# Patient Record
Sex: FEMALE | Race: OTHER | HISPANIC OR LATINO
[De-identification: names, ages, dates, MRNs, and addresses within clinical notes are randomized per-mention and may not be internally consistent; named-entity substitution may affect disease eponyms.]

---

## 2021-07-15 ENCOUNTER — FORM ENCOUNTER (OUTPATIENT)
Age: 46
End: 2021-07-15

## 2021-09-30 PROBLEM — Z00.00 ENCOUNTER FOR PREVENTIVE HEALTH EXAMINATION: Status: ACTIVE | Noted: 2021-09-30

## 2021-10-04 ENCOUNTER — APPOINTMENT (OUTPATIENT)
Dept: BREAST CENTER | Facility: CLINIC | Age: 46
End: 2021-10-04
Payer: OTHER GOVERNMENT

## 2021-10-04 VITALS
OXYGEN SATURATION: 97 % | SYSTOLIC BLOOD PRESSURE: 131 MMHG | WEIGHT: 158 LBS | BODY MASS INDEX: 29.83 KG/M2 | HEIGHT: 61 IN | DIASTOLIC BLOOD PRESSURE: 86 MMHG | HEART RATE: 70 BPM

## 2021-10-04 PROCEDURE — 99072 ADDL SUPL MATRL&STAF TM PHE: CPT

## 2021-10-04 PROCEDURE — 99205 OFFICE O/P NEW HI 60 MIN: CPT

## 2021-10-04 RX ORDER — FEXOFENADINE HCL 60 MG
CAPSULE ORAL
Refills: 0 | Status: ACTIVE | COMMUNITY

## 2021-10-04 RX ORDER — BACILLUS COAGULANS/INULIN 1B-250 MG
CAPSULE ORAL
Refills: 0 | Status: ACTIVE | COMMUNITY

## 2021-10-14 ENCOUNTER — NON-APPOINTMENT (OUTPATIENT)
Age: 46
End: 2021-10-14

## 2021-10-14 ENCOUNTER — OUTPATIENT (OUTPATIENT)
Dept: OUTPATIENT SERVICES | Facility: HOSPITAL | Age: 46
LOS: 1 days | End: 2021-10-14
Payer: COMMERCIAL

## 2021-10-14 ENCOUNTER — RESULT REVIEW (OUTPATIENT)
Age: 46
End: 2021-10-14

## 2021-10-14 DIAGNOSIS — D05.12 INTRADUCTAL CARCINOMA IN SITU OF LEFT BREAST: ICD-10-CM

## 2021-10-14 PROCEDURE — 88321 CONSLTJ&REPRT SLD PREP ELSWR: CPT

## 2021-10-14 PROCEDURE — 88323 CONSLTJ&REPRT MATRL PREP SLD: CPT | Mod: 26

## 2021-10-14 PROCEDURE — 88323 CONSLTJ&REPRT MATRL PREP SLD: CPT

## 2021-10-15 ENCOUNTER — NON-APPOINTMENT (OUTPATIENT)
Age: 46
End: 2021-10-15

## 2021-10-15 LAB — SURGICAL PATHOLOGY STUDY: SIGNIFICANT CHANGE UP

## 2021-11-11 ENCOUNTER — NON-APPOINTMENT (OUTPATIENT)
Age: 46
End: 2021-11-11

## 2021-11-15 ENCOUNTER — APPOINTMENT (OUTPATIENT)
Dept: BREAST CENTER | Facility: CLINIC | Age: 46
End: 2021-11-15

## 2021-11-16 ENCOUNTER — LABORATORY RESULT (OUTPATIENT)
Age: 46
End: 2021-11-16

## 2021-11-18 ENCOUNTER — TRANSCRIPTION ENCOUNTER (OUTPATIENT)
Age: 46
End: 2021-11-18

## 2021-11-18 ENCOUNTER — OUTPATIENT (OUTPATIENT)
Dept: OUTPATIENT SERVICES | Facility: HOSPITAL | Age: 46
LOS: 1 days | End: 2021-11-18
Payer: OTHER GOVERNMENT

## 2021-11-18 VITALS
DIASTOLIC BLOOD PRESSURE: 83 MMHG | HEART RATE: 93 BPM | TEMPERATURE: 98 F | WEIGHT: 164.24 LBS | SYSTOLIC BLOOD PRESSURE: 135 MMHG | RESPIRATION RATE: 16 BRPM | HEIGHT: 61 IN | OXYGEN SATURATION: 100 %

## 2021-11-18 DIAGNOSIS — Z90.49 ACQUIRED ABSENCE OF OTHER SPECIFIED PARTS OF DIGESTIVE TRACT: Chronic | ICD-10-CM

## 2021-11-18 DIAGNOSIS — Z98.891 HISTORY OF UTERINE SCAR FROM PREVIOUS SURGERY: Chronic | ICD-10-CM

## 2021-11-18 PROCEDURE — 78195 LYMPH SYSTEM IMAGING: CPT

## 2021-11-18 PROCEDURE — 78195 LYMPH SYSTEM IMAGING: CPT | Mod: 26

## 2021-11-18 PROCEDURE — A9541: CPT

## 2021-11-18 NOTE — ASU PATIENT PROFILE, ADULT - NSICDXPASTMEDICALHX_GEN_ALL_CORE_FT
PAST MEDICAL HISTORY:  Breast mass     Gastritis     GERD (gastroesophageal reflux disease)     History of chronic fatigue     Nonalcoholic fatty liver disease without nonalcoholic steatohepatitis (BARRETO)

## 2021-11-19 ENCOUNTER — APPOINTMENT (OUTPATIENT)
Dept: BREAST CENTER | Facility: HOSPITAL | Age: 46
End: 2021-11-19

## 2021-11-19 ENCOUNTER — INPATIENT (INPATIENT)
Facility: HOSPITAL | Age: 46
LOS: 0 days | Discharge: HOME | DRG: 581 | End: 2021-11-20
Attending: SURGERY | Admitting: SURGERY
Payer: COMMERCIAL

## 2021-11-19 ENCOUNTER — RESULT REVIEW (OUTPATIENT)
Age: 46
End: 2021-11-19

## 2021-11-19 DIAGNOSIS — Z98.891 HISTORY OF UTERINE SCAR FROM PREVIOUS SURGERY: Chronic | ICD-10-CM

## 2021-11-19 DIAGNOSIS — Z90.49 ACQUIRED ABSENCE OF OTHER SPECIFIED PARTS OF DIGESTIVE TRACT: Chronic | ICD-10-CM

## 2021-11-19 PROCEDURE — 88307 TISSUE EXAM BY PATHOLOGIST: CPT | Mod: 26

## 2021-11-19 PROCEDURE — 19303 MAST SIMPLE COMPLETE: CPT | Mod: 50

## 2021-11-19 PROCEDURE — 38525 BIOPSY/REMOVAL LYMPH NODES: CPT | Mod: LT

## 2021-11-19 PROCEDURE — 88344 IMHCHEM/IMCYTCHM EA MLT ANTB: CPT | Mod: 26,59

## 2021-11-19 PROCEDURE — 88360 TUMOR IMMUNOHISTOCHEM/MANUAL: CPT | Mod: 26

## 2021-11-19 PROCEDURE — 88342 IMHCHEM/IMCYTCHM 1ST ANTB: CPT | Mod: 26,59

## 2021-11-19 PROCEDURE — 88331 PATH CONSLTJ SURG 1 BLK 1SPC: CPT | Mod: 26

## 2021-11-19 RX ORDER — CEFAZOLIN SODIUM 1 G
1000 VIAL (EA) INJECTION EVERY 8 HOURS
Refills: 0 | Status: DISCONTINUED | OUTPATIENT
Start: 2021-11-19 | End: 2021-11-20

## 2021-11-19 RX ORDER — OXYCODONE AND ACETAMINOPHEN 5; 325 MG/1; MG/1
1 TABLET ORAL EVERY 4 HOURS
Refills: 0 | Status: DISCONTINUED | OUTPATIENT
Start: 2021-11-19 | End: 2021-11-20

## 2021-11-19 RX ORDER — ONDANSETRON 8 MG/1
4 TABLET, FILM COATED ORAL EVERY 6 HOURS
Refills: 0 | Status: DISCONTINUED | OUTPATIENT
Start: 2021-11-19 | End: 2021-11-20

## 2021-11-19 RX ORDER — SENNA PLUS 8.6 MG/1
2 TABLET ORAL
Qty: 0 | Refills: 0 | DISCHARGE
Start: 2021-11-19

## 2021-11-19 RX ORDER — SENNA PLUS 8.6 MG/1
2 TABLET ORAL AT BEDTIME
Refills: 0 | Status: DISCONTINUED | OUTPATIENT
Start: 2021-11-19 | End: 2021-11-20

## 2021-11-19 RX ORDER — ACETAMINOPHEN 500 MG
650 TABLET ORAL EVERY 6 HOURS
Refills: 0 | Status: DISCONTINUED | OUTPATIENT
Start: 2021-11-19 | End: 2021-11-20

## 2021-11-19 RX ORDER — OXYCODONE AND ACETAMINOPHEN 5; 325 MG/1; MG/1
2 TABLET ORAL EVERY 6 HOURS
Refills: 0 | Status: DISCONTINUED | OUTPATIENT
Start: 2021-11-19 | End: 2021-11-20

## 2021-11-19 RX ORDER — HYDROMORPHONE HYDROCHLORIDE 2 MG/ML
0.5 INJECTION INTRAMUSCULAR; INTRAVENOUS; SUBCUTANEOUS
Refills: 0 | Status: DISCONTINUED | OUTPATIENT
Start: 2021-11-19 | End: 2021-11-19

## 2021-11-19 RX ORDER — APREPITANT 80 MG/1
40 CAPSULE ORAL ONCE
Refills: 0 | Status: COMPLETED | OUTPATIENT
Start: 2021-11-19 | End: 2021-11-19

## 2021-11-19 RX ORDER — ACETAMINOPHEN 500 MG
2 TABLET ORAL
Qty: 60 | Refills: 0
Start: 2021-11-19

## 2021-11-19 RX ORDER — ACETAMINOPHEN 500 MG
1000 TABLET ORAL ONCE
Refills: 0 | Status: COMPLETED | OUTPATIENT
Start: 2021-11-19 | End: 2021-11-19

## 2021-11-19 RX ADMIN — Medication 1000 MILLIGRAM(S): at 07:26

## 2021-11-19 RX ADMIN — APREPITANT 40 MILLIGRAM(S): 80 CAPSULE ORAL at 07:27

## 2021-11-19 RX ADMIN — Medication 650 MILLIGRAM(S): at 17:20

## 2021-11-19 RX ADMIN — Medication 650 MILLIGRAM(S): at 18:20

## 2021-11-19 RX ADMIN — Medication 100 MILLIGRAM(S): at 17:21

## 2021-11-19 RX ADMIN — Medication 100 MILLIGRAM(S): at 23:53

## 2021-11-19 NOTE — BRIEF OPERATIVE NOTE - NSICDXBRIEFPOSTOP_GEN_ALL_CORE_FT
POST-OP DIAGNOSIS:  Ductal carcinoma in situ (DCIS) of left breast 19-Nov-2021 12:03:25  Ada Gallo

## 2021-11-19 NOTE — PROGRESS NOTE ADULT - SUBJECTIVE AND OBJECTIVE BOX
Surgery Post-Op Note    Procedure: Bilateral breast mastectomy with left sentinel lymph node biopsy.     Diagnosis/Indication: left DCIS    Surgeon: Dr. Castro    S: Pt complaining of slight chest pain. Denies SOB, n/v, ab pain. Pain controlled with medication.    O:  T(C): 36.7 (11-19-21 @ 12:24), Max: 36.7 (11-19-21 @ 12:24)  T(F): 98.1 (11-19-21 @ 12:24), Max: 98.1 (11-19-21 @ 12:24)  HR: 80 (11-19-21 @ 13:24) (80 - 87)  BP: 121/80 (11-19-21 @ 13:24) (113/60 - 121/80)  RR: 18 (11-19-21 @ 13:24) (15 - 18)  SpO2: 99% (11-19-21 @ 13:24) (98% - 100%)  Wt(kg): --          Physical exam:   Gen: NAD, resting comfortably in bed  C/V: NSR  Pulm: Nonlabored breathing, no respiratory distress  Chest: dressing in place c/d/i on b/l chest, JPx2 SS more on sanguinous side with appropriate output, no evidence of swelling/hematoma appreciated   Abd: soft, NT/ND  Extrem: WWP, no calf edema, SCDs in place      A/P: 46yFemale s/p above procedure  Diet: Regualr  Pain/nausea control: Percocet and zofran  DVT ppx: SCD  Dispo plan: 23 hr obvs

## 2021-11-19 NOTE — ASU DISCHARGE PLAN (ADULT/PEDIATRIC) - CARE PROVIDER_API CALL
Carlota Isaacs)  Surgery  [address] [address]  Phone: [phone]  Fax: [phone]  Follow Up Time: 1 week    Eliseo Salazar  PLASTIC SURGERY  [address] [place] [id number]  Phone: [phone]  Fax: [phone]  Follow Up Time: 1 week

## 2021-11-19 NOTE — BRIEF OPERATIVE NOTE - OPERATION/FINDINGS
Patient supine. Prepped and draped sterile. Excision of right nipple and breast tissue. Hemostasis assured. Packed with wet lap. Excision of nipple and left breast tissue, left axillary dissection for encounter 3 hot sentinel lymph nodes. Irrigated. Hemostasis assured. Plastics placed bilateral tissue expanders prepectoral with Aloderm. Skin closed with Monocryl. Steri-strips. Non-adherent gauze and Tegaderm. Pink Bra

## 2021-11-19 NOTE — ASU DISCHARGE PLAN (ADULT/PEDIATRIC) - PROVIDER TOKENS
PROVIDER:[TOKEN:[[id number]],FOLLOWUP:[1 week]],PROVIDER:[TOKEN:[[id number]],FOLLOWUP:[1 week]]

## 2021-11-19 NOTE — ASU DISCHARGE PLAN (ADULT/PEDIATRIC) - ASU DC SPECIAL INSTRUCTIONSFT
Please, leave the pink bra on all the time until Dr. Salazar's team reaches out to you   Please, keep dressings dry. No showering in the next 48 hours or until Dr. Salazar's team tells you   Please, use the incentive spirometer as instructed at least 5 times every hour   Please, measure and record the drain output. Dr. Salazar will reach out to you to set appointment for removal in the first week  Please, stay out of bed and walk at least 5 times a day

## 2021-11-20 ENCOUNTER — TRANSCRIPTION ENCOUNTER (OUTPATIENT)
Age: 46
End: 2021-11-20

## 2021-11-20 VITALS
HEART RATE: 82 BPM | RESPIRATION RATE: 19 BRPM | SYSTOLIC BLOOD PRESSURE: 119 MMHG | OXYGEN SATURATION: 98 % | TEMPERATURE: 98 F | DIASTOLIC BLOOD PRESSURE: 73 MMHG

## 2021-11-20 LAB
ANION GAP SERPL CALC-SCNC: 17 MMOL/L — SIGNIFICANT CHANGE UP (ref 5–17)
BUN SERPL-MCNC: 8 MG/DL — SIGNIFICANT CHANGE UP (ref 7–23)
CALCIUM SERPL-MCNC: 9.2 MG/DL — SIGNIFICANT CHANGE UP (ref 8.4–10.5)
CHLORIDE SERPL-SCNC: 106 MMOL/L — SIGNIFICANT CHANGE UP (ref 96–108)
CO2 SERPL-SCNC: 17 MMOL/L — LOW (ref 22–31)
COVID-19 NUCLEOCAPSID GAM AB INTERP: NEGATIVE — SIGNIFICANT CHANGE UP
COVID-19 NUCLEOCAPSID TOTAL GAM ANTIBODY RESULT: 0.08 INDEX — SIGNIFICANT CHANGE UP
COVID-19 SPIKE DOMAIN AB INTERP: POSITIVE
COVID-19 SPIKE DOMAIN ANTIBODY RESULT: >250 U/ML — HIGH
CREAT SERPL-MCNC: 0.82 MG/DL — SIGNIFICANT CHANGE UP (ref 0.5–1.3)
GLUCOSE SERPL-MCNC: 162 MG/DL — HIGH (ref 70–99)
HCT VFR BLD CALC: 37.5 % — SIGNIFICANT CHANGE UP (ref 34.5–45)
HGB BLD-MCNC: 12.4 G/DL — SIGNIFICANT CHANGE UP (ref 11.5–15.5)
MAGNESIUM SERPL-MCNC: 2.2 MG/DL — SIGNIFICANT CHANGE UP (ref 1.6–2.6)
MCHC RBC-ENTMCNC: 29 PG — SIGNIFICANT CHANGE UP (ref 27–34)
MCHC RBC-ENTMCNC: 33.1 GM/DL — SIGNIFICANT CHANGE UP (ref 32–36)
MCV RBC AUTO: 87.6 FL — SIGNIFICANT CHANGE UP (ref 80–100)
NRBC # BLD: 0 /100 WBCS — SIGNIFICANT CHANGE UP (ref 0–0)
PHOSPHATE SERPL-MCNC: 2.6 MG/DL — SIGNIFICANT CHANGE UP (ref 2.5–4.5)
PLATELET # BLD AUTO: 254 K/UL — SIGNIFICANT CHANGE UP (ref 150–400)
POTASSIUM SERPL-MCNC: 3.8 MMOL/L — SIGNIFICANT CHANGE UP (ref 3.5–5.3)
POTASSIUM SERPL-SCNC: 3.8 MMOL/L — SIGNIFICANT CHANGE UP (ref 3.5–5.3)
RBC # BLD: 4.28 M/UL — SIGNIFICANT CHANGE UP (ref 3.8–5.2)
RBC # FLD: 14.2 % — SIGNIFICANT CHANGE UP (ref 10.3–14.5)
SARS-COV-2 IGG+IGM SERPL QL IA: 0.08 INDEX — SIGNIFICANT CHANGE UP
SARS-COV-2 IGG+IGM SERPL QL IA: >250 U/ML — HIGH
SARS-COV-2 IGG+IGM SERPL QL IA: NEGATIVE — SIGNIFICANT CHANGE UP
SARS-COV-2 IGG+IGM SERPL QL IA: POSITIVE
SODIUM SERPL-SCNC: 140 MMOL/L — SIGNIFICANT CHANGE UP (ref 135–145)
WBC # BLD: 13.76 K/UL — HIGH (ref 3.8–10.5)
WBC # FLD AUTO: 13.76 K/UL — HIGH (ref 3.8–10.5)

## 2021-11-20 PROCEDURE — 88360 TUMOR IMMUNOHISTOCHEM/MANUAL: CPT

## 2021-11-20 PROCEDURE — 80048 BASIC METABOLIC PNL TOTAL CA: CPT

## 2021-11-20 PROCEDURE — 83735 ASSAY OF MAGNESIUM: CPT

## 2021-11-20 PROCEDURE — 36415 COLL VENOUS BLD VENIPUNCTURE: CPT

## 2021-11-20 PROCEDURE — 84100 ASSAY OF PHOSPHORUS: CPT

## 2021-11-20 PROCEDURE — 88344 IMHCHEM/IMCYTCHM EA MLT ANTB: CPT

## 2021-11-20 PROCEDURE — 88307 TISSUE EXAM BY PATHOLOGIST: CPT

## 2021-11-20 PROCEDURE — 88331 PATH CONSLTJ SURG 1 BLK 1SPC: CPT

## 2021-11-20 PROCEDURE — 86769 SARS-COV-2 COVID-19 ANTIBODY: CPT

## 2021-11-20 PROCEDURE — 85027 COMPLETE CBC AUTOMATED: CPT

## 2021-11-20 PROCEDURE — C1789: CPT

## 2021-11-20 PROCEDURE — 88341 IMHCHEM/IMCYTCHM EA ADD ANTB: CPT

## 2021-11-20 RX ORDER — SODIUM,POTASSIUM PHOSPHATES 278-250MG
1 POWDER IN PACKET (EA) ORAL ONCE
Refills: 0 | Status: COMPLETED | OUTPATIENT
Start: 2021-11-20 | End: 2021-11-20

## 2021-11-20 RX ADMIN — Medication 650 MILLIGRAM(S): at 09:45

## 2021-11-20 RX ADMIN — Medication 100 MILLIGRAM(S): at 07:01

## 2021-11-20 RX ADMIN — Medication 1 PACKET(S): at 11:08

## 2021-11-20 RX ADMIN — Medication 650 MILLIGRAM(S): at 10:45

## 2021-11-20 NOTE — PROGRESS NOTE ADULT - SUBJECTIVE AND OBJECTIVE BOX
SUBJECTIVE:      MEDICATIONS  (STANDING):  ceFAZolin   IVPB 1000 milliGRAM(s) IV Intermittent every 8 hours    MEDICATIONS  (PRN):  acetaminophen     Tablet .. 650 milliGRAM(s) Oral every 6 hours PRN Mild Pain (1 - 3)  ondansetron Injectable 4 milliGRAM(s) IV Push every 6 hours PRN Nausea  oxycodone    5 mG/acetaminophen 325 mG 1 Tablet(s) Oral every 4 hours PRN Moderate Pain (4 - 6)  oxycodone    5 mG/acetaminophen 325 mG 2 Tablet(s) Oral every 6 hours PRN Severe Pain (7 - 10)  senna 2 Tablet(s) Oral at bedtime PRN Constipation      Vital Signs Last 24 Hrs  T(C): 36.8 (20 Nov 2021 05:43), Max: 36.9 (19 Nov 2021 15:00)  T(F): 98.2 (20 Nov 2021 05:43), Max: 98.4 (19 Nov 2021 15:00)  HR: 71 (20 Nov 2021 05:43) (71 - 87)  BP: 106/65 (20 Nov 2021 05:43) (106/65 - 141/86)  BP(mean): 95 (19 Nov 2021 13:39) (81 - 96)  RR: 17 (20 Nov 2021 05:43) (15 - 18)  SpO2: 97% (20 Nov 2021 05:43) (97% - 100%)    Physical Exam:      I&O's Summary    19 Nov 2021 07:01  -  20 Nov 2021 07:00  --------------------------------------------------------  IN: 240 mL / OUT: 2145 mL / NET: -1905 mL        LABS:              CAPILLARY BLOOD GLUCOSE            RADIOLOGY & ADDITIONAL STUDIES:   SUBJECTIVE: Doing well this AM. NAEON. Denies n/v. Endorses f/bm. Denies cp/sob. Pain is well controlled. Ambulating as tolerated. Tolerating diet.      MEDICATIONS  (STANDING):  ceFAZolin   IVPB 1000 milliGRAM(s) IV Intermittent every 8 hours    MEDICATIONS  (PRN):  acetaminophen     Tablet .. 650 milliGRAM(s) Oral every 6 hours PRN Mild Pain (1 - 3)  ondansetron Injectable 4 milliGRAM(s) IV Push every 6 hours PRN Nausea  oxycodone    5 mG/acetaminophen 325 mG 1 Tablet(s) Oral every 4 hours PRN Moderate Pain (4 - 6)  oxycodone    5 mG/acetaminophen 325 mG 2 Tablet(s) Oral every 6 hours PRN Severe Pain (7 - 10)  senna 2 Tablet(s) Oral at bedtime PRN Constipation      Vital Signs Last 24 Hrs  T(C): 36.8 (20 Nov 2021 05:43), Max: 36.9 (19 Nov 2021 15:00)  T(F): 98.2 (20 Nov 2021 05:43), Max: 98.4 (19 Nov 2021 15:00)  HR: 71 (20 Nov 2021 05:43) (71 - 87)  BP: 106/65 (20 Nov 2021 05:43) (106/65 - 141/86)  BP(mean): 95 (19 Nov 2021 13:39) (81 - 96)  RR: 17 (20 Nov 2021 05:43) (15 - 18)  SpO2: 97% (20 Nov 2021 05:43) (97% - 100%)    Physical Exam:  GEN: NAD, resting comfortably in bed  HEENT: MMM  CV: RRR  Resp: nonlabored breathing, no respiratory distress  Breast: incisions clean/dry/intact, JPs w/ ss output  Abd: soft, nontender, nondistended  Ext: WWP, no edema, no calf tenderness  Neuro: no focal deficits  Psych: pleasant    I&O's Summary    19 Nov 2021 07:01  -  20 Nov 2021 07:00  --------------------------------------------------------  IN: 240 mL / OUT: 2145 mL / NET: -1905 mL

## 2021-11-20 NOTE — DISCHARGE NOTE NURSING/CASE MANAGEMENT/SOCIAL WORK - PATIENT PORTAL LINK FT
You can access the FollowMyHealth Patient Portal offered by [facility] by registering at the following website: [email or web address]. By joining [employer]’s FollowMyHealth portal, you will also be able to view your health information using other applications (apps) compatible with our system.

## 2021-11-20 NOTE — PROGRESS NOTE ADULT - SUBJECTIVE AND OBJECTIVE BOX
S: pt seen and examined doing great. ambulating, tolerating diet, pain controlled    AVSS    Gen NAD  Breasts: soft no collections, drains ss; dressings cdi    A/P 46 F POD1 from starla mastectomy and recon with TE    -ok for dc home from prs perspective  -use prescriptions as directed  -measure drain outputs  -encourage ambulation and IS  -f/u with Dr. Salazar

## 2021-11-20 NOTE — PROGRESS NOTE ADULT - ASSESSMENT
46F s/p bilat mastectomy w/ LNB + tissue expanders.     Regular diet  OOB/IS/AMB  Pain   Nausea   ARIANA drain x2   Pink bra on at all times  d/c Home today

## 2021-11-24 DIAGNOSIS — D05.12 INTRADUCTAL CARCINOMA IN SITU OF LEFT BREAST: ICD-10-CM

## 2021-11-24 DIAGNOSIS — K21.9 GASTRO-ESOPHAGEAL REFLUX DISEASE WITHOUT ESOPHAGITIS: ICD-10-CM

## 2021-11-24 DIAGNOSIS — R11.0 NAUSEA: ICD-10-CM

## 2021-12-01 LAB — SURGICAL PATHOLOGY STUDY: SIGNIFICANT CHANGE UP

## 2021-12-02 ENCOUNTER — APPOINTMENT (OUTPATIENT)
Dept: BREAST CENTER | Facility: CLINIC | Age: 46
End: 2021-12-02
Payer: OTHER GOVERNMENT

## 2021-12-02 ENCOUNTER — APPOINTMENT (OUTPATIENT)
Dept: HEMATOLOGY ONCOLOGY | Facility: CLINIC | Age: 46
End: 2021-12-02
Payer: OTHER GOVERNMENT

## 2021-12-02 VITALS
DIASTOLIC BLOOD PRESSURE: 78 MMHG | HEART RATE: 86 BPM | HEIGHT: 61 IN | SYSTOLIC BLOOD PRESSURE: 128 MMHG | BODY MASS INDEX: 30.58 KG/M2 | WEIGHT: 162 LBS

## 2021-12-02 PROBLEM — K21.9 GASTRO-ESOPHAGEAL REFLUX DISEASE WITHOUT ESOPHAGITIS: Chronic | Status: ACTIVE | Noted: 2021-11-18

## 2021-12-02 PROBLEM — N63.0 UNSPECIFIED LUMP IN UNSPECIFIED BREAST: Chronic | Status: ACTIVE | Noted: 2021-11-18

## 2021-12-02 PROBLEM — K76.0 FATTY (CHANGE OF) LIVER, NOT ELSEWHERE CLASSIFIED: Chronic | Status: ACTIVE | Noted: 2021-11-18

## 2021-12-02 PROBLEM — Z87.898 PERSONAL HISTORY OF OTHER SPECIFIED CONDITIONS: Chronic | Status: ACTIVE | Noted: 2021-11-18

## 2021-12-02 PROBLEM — K29.70 GASTRITIS, UNSPECIFIED, WITHOUT BLEEDING: Chronic | Status: ACTIVE | Noted: 2021-11-18

## 2021-12-02 PROCEDURE — 99072 ADDL SUPL MATRL&STAF TM PHE: CPT

## 2021-12-02 PROCEDURE — 99204 OFFICE O/P NEW MOD 45 MIN: CPT

## 2021-12-02 PROCEDURE — 99244 OFF/OP CNSLTJ NEW/EST MOD 40: CPT

## 2021-12-02 PROCEDURE — 99024 POSTOP FOLLOW-UP VISIT: CPT

## 2021-12-02 NOTE — REVIEW OF SYSTEMS
[Fever] : no fever [Chills] : no chills [Chest Pain] : no chest pain [Palpitations] : no palpitations [Shortness Of Breath] : no shortness of breath [Cough] : no cough [Abdominal Pain] : no abdominal pain [Vomiting] : no vomiting [Pelvic Pain] : no pelvic pain [Abn Vaginal Bleeding] : no unexplained vaginal bleeding [Joint Swelling] : no joint swelling [Joint Stiffness] : no joint stiffness [Skin Lesions] : no skin lesions [Skin Wound] : no skin wound [Dizziness] : no dizziness [Fainting] : no fainting [Hot Flashes] : no hot flashes [Muscle Weakness] : no muscle weakness [Swollen Glands] : no swollen glands [Swollen Glands In The Neck] : no swollen glands in the neck

## 2021-12-02 NOTE — HISTORY OF PRESENT ILLNESS
[de-identified] : Ms. DALY is a 46 year old female who is being seen at the request of Dr. Gloria Kearney to discuss adjuvant systemic treatment options for a recently diagnosed LEFT breast cancer. The patient was experiencing BL nipple sensitivity but she did not have palpable masses, skin changes, or nipple discharge bilaterally. Of note, her sister is BRCA 1 positive and underwent a prophylactic BL mastectomy and her mother was BRCA 1 positive and had ovarian CA.  \par \par 7/16/21: B/L MG ([facility])- Fibroglandular. L UOQ 3:00 microcalcs. BIRADS 0\par \par 8/31/21: Dx L MG ([facility])- Fibroglandular. L 2.4cm 2:00 pleomorphic microcalcs (stereo bx rec.). US- WNL. BIRADS 4\par \par 9/16/21: L stereo bx for 2:00 calcs ([facility])- DCIS, high nuclear grade. ER/[place] -\par \par 10/21: Bilateral mastectomy. Path: 5.4 cm DCIS, positive anterior margin LEFT breast; 0.1 cm area of invasive carcinoma, HER2+

## 2021-12-02 NOTE — PHYSICAL EXAM
[de-identified] : s/p bilateral mastectomy, surgical scars healing well.  [de-identified] : Bilateral breast/axilla/supraclavicular area: No masses, discharge, or adenopathy [de-identified] : 4:00 post bx site healed

## 2021-12-06 ENCOUNTER — APPOINTMENT (OUTPATIENT)
Dept: HEMATOLOGY ONCOLOGY | Facility: CLINIC | Age: 46
End: 2021-12-06
Payer: OTHER GOVERNMENT

## 2021-12-06 VITALS
SYSTOLIC BLOOD PRESSURE: 126 MMHG | RESPIRATION RATE: 18 BRPM | DIASTOLIC BLOOD PRESSURE: 81 MMHG | TEMPERATURE: 98.3 F | HEART RATE: 73 BPM | OXYGEN SATURATION: 99 % | WEIGHT: 163 LBS | BODY MASS INDEX: 30.78 KG/M2 | HEIGHT: 61 IN

## 2021-12-06 PROCEDURE — 99214 OFFICE O/P EST MOD 30 MIN: CPT

## 2021-12-06 PROCEDURE — 99072 ADDL SUPL MATRL&STAF TM PHE: CPT

## 2021-12-06 NOTE — PAST MEDICAL HISTORY
[Menstruating] : The patient is menstruating [Menarche Age ____] : age at menarche was [unfilled] [Definite ___ (Date)] : the last menstrual period was [unfilled] [Regular Cycle Intervals] : have been regular [Total Preg ___] : G[unfilled] [Live Births ___] : P[unfilled]  [Age At Live Birth ___] : Age at live birth: [unfilled] [History of Hormone Replacement Treatment] : has no history of hormone replacement treatment [FreeTextEntry1] : hx of polyps [FreeTextEntry5] : yes [FreeTextEntry6] : no [FreeTextEntry7] : yes [FreeTextEntry8] : yes

## 2021-12-06 NOTE — HISTORY OF PRESENT ILLNESS
[Disease: _____________________] : Disease: [unfilled] [T: ___] : T[unfilled] [N: ___] : N[unfilled] [M: ___] : M[unfilled] [AJCC Stage: ____] : AJCC Stage: [unfilled] [Treatment Protocol] : Treatment Protocol [de-identified] : Ms. DALY is a 46 year old female who is being seen at the request of Dr. Gloria Kearney to discuss adjuvant systemic treatment options for a recently diagnosed LEFT breast cancer. The patient was experiencing BL nipple sensitivity but she did not have palpable masses, skin changes, or nipple discharge bilaterally. Of note, her sister is BRCA 1 positive and underwent a prophylactic BL mastectomy and her mother was BRCA 1 positive and had ovarian CA.  \par \par 7/16/21: B/L MG ([facility])- Fibroglandular. L UOQ 3:00 microcalcs. BIRADS 0\par \par 8/31/21: Dx L MG ([facility])- Fibroglandular. L 2.4cm 2:00 pleomorphic microcalcs (stereo bx rec.). US- WNL. BIRADS 4\par \par 9/16/21: L stereo bx for 2:00 calcs ([facility])- DCIS, high nuclear grade. ER/[place] -\par \par 10/21: Bilateral mastectomy and L SLNB Path: 5.4 cm DCIS, positive anterior margin LEFT breast; 0.1 cm area of invasive ductal carcinoma, 0/3 LNs. HER2+, ER-, [place]-  [de-identified] : invasive ductal carcinoma [de-identified] : HER+, ER-, [place]- [de-identified] : Genetic testing negative [FreeTextEntry1] : Adjuvant weekly paclitaxel x 12 in combination with trastuzumab (8 mg/kg loading dose followed by 6 mg/kg IV every 3 weeks). Trastuzumab will be continued after completion of paclitaxel therapy to complete a total of 1 year [de-identified] : Patient is planning to fly to [place] on 12/15/21.

## 2021-12-06 NOTE — PHYSICAL EXAM
[Normal] : affect appropriate [de-identified] : s/p bilateral mastectomy, surgical scars healing well.  [de-identified] : Bilateral breast/axilla/supraclavicular area: No masses, discharge, or adenopathy [de-identified] : 4:00 post bx site healed

## 2021-12-06 NOTE — REVIEW OF SYSTEMS
[Negative] : Constitutional [Fever] : no fever [Chills] : no chills [Chest Pain] : no chest pain [Palpitations] : no palpitations [Shortness Of Breath] : no shortness of breath [Cough] : no cough [Abdominal Pain] : no abdominal pain [Vomiting] : no vomiting [Pelvic Pain] : no pelvic pain [Abn Vaginal Bleeding] : no unexplained vaginal bleeding [Joint Swelling] : no joint swelling [Joint Stiffness] : no joint stiffness [Skin Lesions] : no skin lesions [Skin Wound] : no skin wound [Dizziness] : no dizziness [Fainting] : no fainting [Hot Flashes] : no hot flashes [Muscle Weakness] : no muscle weakness [Swollen Glands] : no swollen glands [Swollen Glands In The Neck] : no swollen glands in the neck

## 2021-12-07 LAB
ALBUMIN SERPL ELPH-MCNC: 4.5 G/DL
ALP BLD-CCNC: 59 U/L
ALT SERPL-CCNC: 38 U/L
ANION GAP SERPL CALC-SCNC: 16 MMOL/L
APTT BLD: 23.6 SEC
AST SERPL-CCNC: 30 U/L
BASOPHILS # BLD AUTO: 0.02 K/UL
BASOPHILS NFR BLD AUTO: 0.3 %
BILIRUB SERPL-MCNC: 0.2 MG/DL
BUN SERPL-MCNC: 9 MG/DL
CALCIUM SERPL-MCNC: 9.6 MG/DL
CHLORIDE SERPL-SCNC: 103 MMOL/L
CO2 SERPL-SCNC: 20 MMOL/L
CREAT SERPL-MCNC: 0.83 MG/DL
EOSINOPHIL # BLD AUTO: 0.14 K/UL
EOSINOPHIL NFR BLD AUTO: 2.3 %
GLUCOSE SERPL-MCNC: 66 MG/DL
HCT VFR BLD CALC: 38.4 %
HGB BLD-MCNC: 12.6 G/DL
IMM GRANULOCYTES NFR BLD AUTO: 0.3 %
INR PPP: 0.97 RATIO
LYMPHOCYTES # BLD AUTO: 2.13 K/UL
LYMPHOCYTES NFR BLD AUTO: 34.6 %
MAN DIFF?: NORMAL
MCHC RBC-ENTMCNC: 28.4 PG
MCHC RBC-ENTMCNC: 32.8 GM/DL
MCV RBC AUTO: 86.5 FL
MONOCYTES # BLD AUTO: 0.3 K/UL
MONOCYTES NFR BLD AUTO: 4.9 %
NEUTROPHILS # BLD AUTO: 3.55 K/UL
NEUTROPHILS NFR BLD AUTO: 57.6 %
PLATELET # BLD AUTO: 321 K/UL
POTASSIUM SERPL-SCNC: 4.1 MMOL/L
PROT SERPL-MCNC: 7.1 G/DL
PT BLD: 11.6 SEC
RBC # BLD: 4.44 M/UL
RBC # FLD: 13.6 %
SODIUM SERPL-SCNC: 139 MMOL/L
WBC # FLD AUTO: 6.16 K/UL

## 2021-12-08 ENCOUNTER — FORM ENCOUNTER (OUTPATIENT)
Age: 46
End: 2021-12-08

## 2021-12-09 ENCOUNTER — OUTPATIENT (OUTPATIENT)
Dept: OUTPATIENT SERVICES | Facility: HOSPITAL | Age: 46
LOS: 1 days | End: 2021-12-09
Payer: COMMERCIAL

## 2021-12-09 DIAGNOSIS — C50.912 MALIGNANT NEOPLASM OF UNSPECIFIED SITE OF LEFT FEMALE BREAST: ICD-10-CM

## 2021-12-09 DIAGNOSIS — Z98.891 HISTORY OF UTERINE SCAR FROM PREVIOUS SURGERY: Chronic | ICD-10-CM

## 2021-12-09 DIAGNOSIS — Z90.49 ACQUIRED ABSENCE OF OTHER SPECIFIED PARTS OF DIGESTIVE TRACT: Chronic | ICD-10-CM

## 2021-12-09 PROCEDURE — 93306 TTE W/DOPPLER COMPLETE: CPT | Mod: 26

## 2021-12-09 PROCEDURE — 93306 TTE W/DOPPLER COMPLETE: CPT

## 2021-12-13 ENCOUNTER — APPOINTMENT (OUTPATIENT)
Dept: INTERVENTIONAL RADIOLOGY/VASCULAR | Facility: HOSPITAL | Age: 46
End: 2021-12-13
Payer: OTHER GOVERNMENT

## 2021-12-13 ENCOUNTER — OUTPATIENT (OUTPATIENT)
Dept: OUTPATIENT SERVICES | Facility: HOSPITAL | Age: 46
LOS: 1 days | End: 2021-12-13
Payer: COMMERCIAL

## 2021-12-13 ENCOUNTER — RESULT REVIEW (OUTPATIENT)
Age: 46
End: 2021-12-13

## 2021-12-13 DIAGNOSIS — Z98.891 HISTORY OF UTERINE SCAR FROM PREVIOUS SURGERY: Chronic | ICD-10-CM

## 2021-12-13 DIAGNOSIS — Z90.49 ACQUIRED ABSENCE OF OTHER SPECIFIED PARTS OF DIGESTIVE TRACT: Chronic | ICD-10-CM

## 2021-12-13 PROCEDURE — 99152 MOD SED SAME PHYS/QHP 5/>YRS: CPT

## 2021-12-13 PROCEDURE — 77001 FLUOROGUIDE FOR VEIN DEVICE: CPT | Mod: 26

## 2021-12-13 PROCEDURE — 36561 INSERT TUNNELED CV CATH: CPT

## 2021-12-13 PROCEDURE — 76937 US GUIDE VASCULAR ACCESS: CPT | Mod: 26

## 2021-12-13 PROCEDURE — 76937 US GUIDE VASCULAR ACCESS: CPT

## 2021-12-13 PROCEDURE — C1788: CPT

## 2021-12-13 PROCEDURE — 77001 FLUOROGUIDE FOR VEIN DEVICE: CPT

## 2021-12-13 PROCEDURE — C1769: CPT

## 2021-12-13 PROCEDURE — 99153 MOD SED SAME PHYS/QHP EA: CPT

## 2021-12-14 LAB
HBV CORE IGG+IGM SER QL: NONREACTIVE
HBV SURFACE AB SER QL: REACTIVE
HBV SURFACE AG SER QL: NONREACTIVE
SARS-COV-2 N GENE NPH QL NAA+PROBE: NOT DETECTED

## 2021-12-22 ENCOUNTER — APPOINTMENT (OUTPATIENT)
Dept: INTERVENTIONAL RADIOLOGY/VASCULAR | Facility: HOSPITAL | Age: 46
End: 2021-12-22

## 2022-07-20 PROBLEM — Z01.812 ENCOUNTER FOR PREPROCEDURE SCREENING LABORATORY TESTING FOR COVID-19: Status: ACTIVE | Noted: 2021-12-03

## 2022-07-25 ENCOUNTER — APPOINTMENT (OUTPATIENT)
Dept: BREAST CENTER | Facility: CLINIC | Age: 47
End: 2022-07-25

## 2022-07-25 DIAGNOSIS — Z20.822 ENCOUNTER FOR PREPROCEDURAL LABORATORY EXAMINATION: ICD-10-CM

## 2022-07-25 DIAGNOSIS — Z01.812 ENCOUNTER FOR PREPROCEDURAL LABORATORY EXAMINATION: ICD-10-CM

## 2022-09-06 PROBLEM — D05.12 DUCTAL CARCINOMA IN SITU (DCIS) OF LEFT BREAST: Status: ACTIVE | Noted: 2021-09-30

## 2022-09-06 PROBLEM — C50.912 HER2-POSITIVE CARCINOMA OF LEFT BREAST: Status: ACTIVE | Noted: 2021-12-02

## 2022-09-12 ENCOUNTER — APPOINTMENT (OUTPATIENT)
Dept: BREAST CENTER | Facility: CLINIC | Age: 47
End: 2022-09-12

## 2022-09-12 VITALS
HEIGHT: 61 IN | BODY MASS INDEX: 29.07 KG/M2 | WEIGHT: 154 LBS | DIASTOLIC BLOOD PRESSURE: 91 MMHG | SYSTOLIC BLOOD PRESSURE: 134 MMHG | HEART RATE: 88 BPM

## 2022-09-12 DIAGNOSIS — D05.12 INTRADUCTAL CARCINOMA IN SITU OF LEFT BREAST: ICD-10-CM

## 2022-09-12 DIAGNOSIS — C50.912 MALIGNANT NEOPLASM OF UNSPECIFIED SITE OF LEFT FEMALE BREAST: ICD-10-CM

## 2022-09-12 PROCEDURE — 99072 ADDL SUPL MATRL&STAF TM PHE: CPT

## 2022-09-12 PROCEDURE — 99214 OFFICE O/P EST MOD 30 MIN: CPT

## 2022-09-12 RX ORDER — TRASTUZUMAB 150 MG/7.4ML
INJECTION, POWDER, LYOPHILIZED, FOR SOLUTION INTRAVENOUS
Refills: 0 | Status: ACTIVE | COMMUNITY

## 2022-09-12 RX ORDER — TRASTUZUMAB-ANNS 150 MG/7.15ML
INJECTION, POWDER, LYOPHILIZED, FOR SOLUTION INTRAVENOUS
Refills: 0 | Status: ACTIVE | COMMUNITY

## 2022-09-22 ENCOUNTER — TRANSCRIPTION ENCOUNTER (OUTPATIENT)
Age: 47
End: 2022-09-22

## 2023-03-16 ENCOUNTER — APPOINTMENT (OUTPATIENT)
Dept: BREAST CENTER | Facility: CLINIC | Age: 48
End: 2023-03-16
Payer: OTHER GOVERNMENT

## 2023-03-16 VITALS
WEIGHT: 162 LBS | SYSTOLIC BLOOD PRESSURE: 134 MMHG | BODY MASS INDEX: 30.58 KG/M2 | DIASTOLIC BLOOD PRESSURE: 88 MMHG | HEIGHT: 61 IN | HEART RATE: 87 BPM

## 2023-03-16 DIAGNOSIS — Z90.13 ACQUIRED ABSENCE OF BILATERAL BREASTS AND NIPPLES: ICD-10-CM

## 2023-03-16 DIAGNOSIS — Z80.41 FAMILY HISTORY OF MALIGNANT NEOPLASM OF OVARY: ICD-10-CM

## 2023-03-16 DIAGNOSIS — Z80.0 FAMILY HISTORY OF MALIGNANT NEOPLASM OF DIGESTIVE ORGANS: ICD-10-CM

## 2023-03-16 DIAGNOSIS — Z85.3 PERSONAL HISTORY OF MALIGNANT NEOPLASM OF BREAST: ICD-10-CM

## 2023-03-16 DIAGNOSIS — Z78.9 OTHER SPECIFIED HEALTH STATUS: ICD-10-CM

## 2023-03-16 PROCEDURE — 99213 OFFICE O/P EST LOW 20 MIN: CPT

## 2023-03-16 PROCEDURE — 99072 ADDL SUPL MATRL&STAF TM PHE: CPT

## 2023-03-16 NOTE — PAST MEDICAL HISTORY
[Menarche Age ____] : age at menarche was [unfilled] [Definite ___ (Date)] : the last menstrual period was [unfilled] [Regular Cycle Intervals] : have been regular [Total Preg ___] : G[unfilled] [Live Births ___] : P[unfilled]  [Age At Live Birth ___] : Age at live birth: [unfilled] [History of Hormone Replacement Treatment] : has no history of hormone replacement treatment [FreeTextEntry1] : hx of polyps [FreeTextEntry5] : yes [FreeTextEntry6] : no [FreeTextEntry7] : yes [FreeTextEntry8] : yes

## 2023-03-16 NOTE — PHYSICAL EXAM
[Normocephalic] : normocephalic [EOMI] : extra ocular movement intact [Supple] : supple [No Supraclavicular Adenopathy] : no supraclavicular adenopathy [No Cervical Adenopathy] : no cervical adenopathy [de-identified] : B/l chest wall/axilla/supraclavicular area: No evidence of recurrence\par  [de-identified] : \par

## 2023-03-16 NOTE — HISTORY OF PRESENT ILLNESS
[FreeTextEntry1] : Patient is a 48y.o F presents for breast cancer surveillance. S/p B/L TM and L SNB on 11/19/21 (age 46) for LEFT 5.4cm DCIS, ER/[place]-.  Surgical pathology yielded LEFT 0.1cm IDC (ER/MA-, HER2+), DCIS, 0/3 LN. Reconstruction with Dr. Salazar. S/p adjuvant chemotherapy (Dr. Tyler Padron- [place]) and RT. Currently on Herceptin. FHx of breast cancer in maternal grandmother and maternal aunt, ovarian cancer in mother (BRCA+), and sister is BRCA+ (no cancer). Patient is BRCA/9 gene panel negative (tested 2021). Patient denies palpable masses or skin changes bilaterally.\par \par TNM stage: T1a, N0, M0 \par AJCC Stage: 1A \par \par 7/16/21: B/L MG ([facility])- Fibroglandular. L UOQ 3:00 microcalcs. BIRADS 0\par 8/31/21: Dx L MG ([facility])- Fibroglandular. L 2.4cm 2:00 pleomorphic microcalcs (stereo bx rec.). US- WNL. BIRADS 4\par 9/16/21: L stereo bx for 2:00 calcs ([facility])- DCIS, high nuclear grade. ER/[place] -\par 10/12/21: MRI- Dense. L 4.2cm 3-4:00, 7FN clumped linear nonmass enhancement anterior to the chest wall. Tissue marker clip is at the inferior border, towards the center of the area of presumed involvement. Central and medial to bx proven malignancy is a focus of discontinuous nonmass enhancement, appears to be the same process. Together the areas measure 5.4cm, bx proven malignancy. R WNL. BIRADS 6.\par 11/19/21: B/L TM & L SLNB- . L 0.15cm IDC, poorly differentiated. DCIS is present in anterior and focally at posterior margin. 0/3 LN. ER/[place] -, HER2 +, R benign

## 2023-10-23 ENCOUNTER — NON-APPOINTMENT (OUTPATIENT)
Age: 48
End: 2023-10-23

## 2023-11-22 ENCOUNTER — APPOINTMENT (OUTPATIENT)
Dept: BREAST CENTER | Facility: CLINIC | Age: 48
End: 2023-11-22

## 2024-02-22 ENCOUNTER — APPOINTMENT (OUTPATIENT)
Dept: BREAST CENTER | Facility: CLINIC | Age: 49
End: 2024-02-22

## 2024-03-26 ENCOUNTER — NON-APPOINTMENT (OUTPATIENT)
Age: 49
End: 2024-03-26

## 2024-03-27 ENCOUNTER — APPOINTMENT (OUTPATIENT)
Dept: BREAST CENTER | Facility: CLINIC | Age: 49
End: 2024-03-27

## 2024-03-29 ENCOUNTER — APPOINTMENT (OUTPATIENT)
Dept: BREAST CENTER | Facility: CLINIC | Age: 49
End: 2024-03-29

## 2024-03-29 NOTE — PAST MEDICAL HISTORY
29-Mar-2024 04:07 [History of Hormone Replacement Treatment] : has no history of hormone replacement treatment [FreeTextEntry1] : hx of polyps [FreeTextEntry5] : yes [FreeTextEntry6] : no [FreeTextEntry7] : yes [FreeTextEntry8] : yes

## 2024-07-29 ENCOUNTER — NON-APPOINTMENT (OUTPATIENT)
Age: 49
End: 2024-07-29

## 2024-07-29 NOTE — PLAN
[TextEntry] : Discussed clinical exam findings with patient. She is to return in 1 year for annual office visit.

## 2024-07-29 NOTE — PHYSICAL EXAM
[Normocephalic] : normocephalic [EOMI] : extra ocular movement intact [Supple] : supple [No Supraclavicular Adenopathy] : no supraclavicular adenopathy [No Cervical Adenopathy] : no cervical adenopathy [de-identified] : B/l chest wall/axilla/supraclavicular area: No evidence of recurrence\par   [de-identified] : \par

## 2024-07-29 NOTE — PAST MEDICAL HISTORY
[Menarche Age ____] : age at menarche was [unfilled] [History of Hormone Replacement Treatment] : has no history of hormone replacement treatment [Definite ___ (Date)] : the last menstrual period was [unfilled] [Regular Cycle Intervals] : have been regular [Total Preg ___] : G[unfilled] [Live Births ___] : P[unfilled]  [Age At Live Birth ___] : Age at live birth: [unfilled] [FreeTextEntry1] : hx of polyps [FreeTextEntry5] : yes [FreeTextEntry6] : no [FreeTextEntry7] : yes [FreeTextEntry8] : yes

## 2024-07-29 NOTE — HISTORY OF PRESENT ILLNESS
[FreeTextEntry1] : Patient is a 49y.o F presents for breast cancer surveillance. S/p B/L TM and L SNB on 11/19/21 (age 46) for LEFT 5.4cm DCIS, ER/OR-.  Surgical pathology yielded LEFT 0.1cm IDC (ER/OR-, HER2+), DCIS, 0/3 LN. Reconstruction with Dr. Salazar. S/p adjuvant chemotherapy (Dr. Tyler Padron- [place]) and RT. Currently on Herceptin????? S/P B/l implant exchange, abdominoplasty, and BILLY-BSO???? FHx of breast cancer in maternal grandmother and maternal aunt, ovarian cancer in mother (BRCA+), and sister is BRCA+ (no cancer). Patient is BRCA/9 gene panel negative (tested 2021). Patient denies palpable masses or skin changes bilaterally.  TNM stage: T1a, N0, M0  AJCC Stage: 1A   7/16/21: B/L MG ([facility])- Fibroglandular. L UOQ 3:00 microcalcs. BIRADS 0 8/31/21: Dx L MG ([facility])- Fibroglandular. L 2.4cm 2:00 pleomorphic microcalcs (stereo bx rec.). US- WNL. BIRADS 4 9/16/21: L stereo bx for 2:00 calcs ([facility])- DCIS, high nuclear grade. ER/OR - 10/12/21: MRI- Dense. L 4.2cm 3-4:00, 7FN clumped linear nonmass enhancement anterior to the chest wall. Tissue marker clip is at the inferior border, towards the center of the area of presumed involvement. Central and medial to bx proven malignancy is a focus of discontinuous nonmass enhancement, appears to be the same process. Together the areas measure 5.4cm, bx proven malignancy. R WNL. BIRADS 6. 11/19/21: B/L TM & L SLNB- . L 0.15cm IDC, poorly differentiated. DCIS is present in anterior and focally at posterior margin. 0/3 LN. ER/[place] -, HER2 +, R benign

## 2024-07-31 ENCOUNTER — NON-APPOINTMENT (OUTPATIENT)
Age: 49
End: 2024-07-31

## 2024-08-12 ENCOUNTER — APPOINTMENT (OUTPATIENT)
Dept: BREAST CENTER | Facility: CLINIC | Age: 49
End: 2024-08-12
Payer: COMMERCIAL

## 2024-08-12 VITALS
WEIGHT: 175 LBS | SYSTOLIC BLOOD PRESSURE: 131 MMHG | HEIGHT: 61 IN | DIASTOLIC BLOOD PRESSURE: 86 MMHG | BODY MASS INDEX: 33.04 KG/M2 | HEART RATE: 75 BPM

## 2024-08-12 DIAGNOSIS — Z90.13 ACQUIRED ABSENCE OF BILATERAL BREASTS AND NIPPLES: ICD-10-CM

## 2024-08-12 PROCEDURE — 99213 OFFICE O/P EST LOW 20 MIN: CPT

## 2024-08-12 RX ORDER — GLUCOSAMINE SULFATE 500 MG
CAPSULE ORAL
Refills: 0 | Status: ACTIVE | COMMUNITY

## 2024-08-12 NOTE — PAST MEDICAL HISTORY
[History of Hormone Replacement Treatment] : has no history of hormone replacement treatment [FreeTextEntry1] : hx of polyps [FreeTextEntry6] : no [FreeTextEntry5] : yes [FreeTextEntry7] : yes [FreeTextEntry8] : yes

## 2024-08-12 NOTE — PAST MEDICAL HISTORY
[History of Hormone Replacement Treatment] : has no history of hormone replacement treatment [FreeTextEntry1] : hx of polyps [FreeTextEntry5] : yes [FreeTextEntry6] : no [FreeTextEntry7] : yes [FreeTextEntry8] : yes

## 2024-08-12 NOTE — HISTORY OF PRESENT ILLNESS
[FreeTextEntry1] : Patient is a 49y.o F presents for breast cancer surveillance. S/p B/L TM and L SNB on 11/19/21 (age 46) for LEFT 5.4cm DCIS, ER/MS-.  Surgical pathology yielded LEFT 0.1cm IDC (ER/MS-, HER2+), DCIS, 0/3 LN. Reconstruction with Dr. Salazar. S/p adjuvant chemotherapy (Dr. Tyler Padron- [place]) and RT. S/P Herceptin.S/P B/l implant exchange, abdominoplasty, and BILLY-BSO 7/2023. FHx of breast cancer in maternal grandmother and maternal aunt, ovarian cancer in mother (BRCA+), and sister is BRCA+ (no cancer). Patient is BRCA/9 gene panel negative (tested 2021). Patient denies palpable masses or skin changes bilaterally.  TNM stage: T1a, N0, M0  AJCC Stage: 1A   7/16/21: B/L MG ([facility])- Fibroglandular. L UOQ 3:00 microcalcs. BIRADS 0 8/31/21: Dx L MG ([facility])- Fibroglandular. L 2.4cm 2:00 pleomorphic microcalcs (stereo bx rec.). US- WNL. BIRADS 4 9/16/21: L stereo bx for 2:00 calcs ([facility])- DCIS, high nuclear grade. ER/MS - 10/12/21: MRI- Dense. L 4.2cm 3-4:00, 7FN clumped linear nonmass enhancement anterior to the chest wall. Tissue marker clip is at the inferior border, towards the center of the area of presumed involvement. Central and medial to bx proven malignancy is a focus of discontinuous nonmass enhancement, appears to be the same process. Together the areas measure 5.4cm, bx proven malignancy. R WNL. BIRADS 6. 11/19/21: B/L TM & L SLNB- . L 0.15cm IDC, poorly differentiated. DCIS is present in anterior and focally at posterior margin. 0/3 LN. ER/[place] -, HER2 +, R benign

## 2024-08-12 NOTE — HISTORY OF PRESENT ILLNESS
[FreeTextEntry1] : Patient is a 49y.o F presents for breast cancer surveillance. S/p B/L TM and L SNB on 11/19/21 (age 46) for LEFT 5.4cm DCIS, ER/CA-.  Surgical pathology yielded LEFT 0.1cm IDC (ER/CA-, HER2+), DCIS, 0/3 LN. Reconstruction with Dr. Salazar. S/p adjuvant chemotherapy (Dr. Tyler Padron- [place]) and RT. S/P Herceptin.S/P B/l implant exchange, abdominoplasty, and BILLY-BSO 7/2023. FHx of breast cancer in maternal grandmother and maternal aunt, ovarian cancer in mother (BRCA+), and sister is BRCA+ (no cancer). Patient is BRCA/9 gene panel negative (tested 2021). Patient denies palpable masses or skin changes bilaterally.  TNM stage: T1a, N0, M0  AJCC Stage: 1A   7/16/21: B/L MG ([facility])- Fibroglandular. L UOQ 3:00 microcalcs. BIRADS 0 8/31/21: Dx L MG ([facility])- Fibroglandular. L 2.4cm 2:00 pleomorphic microcalcs (stereo bx rec.). US- WNL. BIRADS 4 9/16/21: L stereo bx for 2:00 calcs ([facility])- DCIS, high nuclear grade. ER/CA - 10/12/21: MRI- Dense. L 4.2cm 3-4:00, 7FN clumped linear nonmass enhancement anterior to the chest wall. Tissue marker clip is at the inferior border, towards the center of the area of presumed involvement. Central and medial to bx proven malignancy is a focus of discontinuous nonmass enhancement, appears to be the same process. Together the areas measure 5.4cm, bx proven malignancy. R WNL. BIRADS 6. 11/19/21: B/L TM & L SLNB- . L 0.15cm IDC, poorly differentiated. DCIS is present in anterior and focally at posterior margin. 0/3 LN. ER/CA -, HER2 +, R benign

## 2024-08-12 NOTE — PHYSICAL EXAM
[de-identified] : B/l chest wall/axilla/supraclavicular area: No evidence of recurrence\par   [de-identified] : \par

## 2024-08-12 NOTE — PHYSICAL EXAM
[de-identified] : B/l chest wall/axilla/supraclavicular area: No evidence of recurrence\par   [de-identified] : \par

## 2024-08-13 ENCOUNTER — APPOINTMENT (OUTPATIENT)
Dept: OBGYN | Facility: CLINIC | Age: 49
End: 2024-08-13
Payer: COMMERCIAL

## 2024-08-13 DIAGNOSIS — Z71.89 OTHER SPECIFIED COUNSELING: ICD-10-CM

## 2024-08-13 DIAGNOSIS — E28.319 ASYMPTOMATIC PREMATURE MENOPAUSE: ICD-10-CM

## 2024-08-13 DIAGNOSIS — N95.1 MENOPAUSAL AND FEMALE CLIMACTERIC STATES: ICD-10-CM

## 2024-08-13 DIAGNOSIS — G47.9 SLEEP DISORDER, UNSPECIFIED: ICD-10-CM

## 2024-08-13 DIAGNOSIS — D05.12 INTRADUCTAL CARCINOMA IN SITU OF LEFT BREAST: ICD-10-CM

## 2024-08-13 DIAGNOSIS — Z79.899 OTHER LONG TERM (CURRENT) DRUG THERAPY: ICD-10-CM

## 2024-08-13 PROCEDURE — 99204 OFFICE O/P NEW MOD 45 MIN: CPT

## 2024-08-13 RX ORDER — FEZOLINETANT 45 MG/1
45 TABLET, FILM COATED ORAL
Qty: 4 | Refills: 2 | Status: ACTIVE | COMMUNITY
Start: 2024-08-13 | End: 1900-01-01

## 2024-08-13 RX ORDER — VIT C/E/ZN/COPPR/LUTEIN/ZEAXAN 250MG-90MG
CAPSULE ORAL
Refills: 0 | Status: ACTIVE | COMMUNITY

## 2024-08-13 RX ORDER — OXYBUTYNIN CHLORIDE 2.5 MG/1
2.5 TABLET ORAL
Refills: 0 | Status: ACTIVE | COMMUNITY

## 2024-08-13 RX ORDER — TIRZEPATIDE 2.5 MG/.5ML
2.5 INJECTION, SOLUTION SUBCUTANEOUS
Refills: 0 | Status: ACTIVE | COMMUNITY

## 2024-08-13 NOTE — REVIEW OF SYSTEMS
[Night Sweats] : night sweats [FreeTextEntry2] : menopause sx [FreeTextEntry7] : denies hx of liver disease and did show me record of normal liver tests [de-identified] : breast cancer [de-identified] : on zepbound: has already lost 10 lsb

## 2024-08-13 NOTE — PLAN
[FreeTextEntry1] : here to discuss menopause sx consult   *she is being treated as if has brca gene because her mom did (she was negative but was still advised to do b/l mastectomy and BILLY/BSO    Dr Leavitt: july 2023 did BILLY/BSO: she did immediately start having sx she is bothered by VMS *d/w pt not candidate for estrogen due to personal hx of br cancer: she had been hoping for this but I said it is a general contraindiction *vaginal estrogens she can use and she is but does not know name of rx  she could do [facility]: need liver tests q 3 months, 7/8/24 normal, she may do f/u with PMD in [place] r/b/a discussed, cost can be issue: I did give her coupon  also wt gain was really dramatic after her TAHBSO with Dr Leavitt she is now on zepbound: lost 10 lbs  *SHE SHOWED ME RECORDS DONE LFT ON 7/8/24, THEY WERE NORMAL   *hx of breast cancer: sees Gloria Kearney dx'ed in 2021, had chemo and radiation  *sees medical MD in [place] but comes to [facility] for specialist care  Over 50% of face to face time of visit counseling and coordination of care (total time 30m)

## 2024-10-07 NOTE — BRIEF OPERATIVE NOTE - NSICDXBRIEFPREOP_GEN_ALL_CORE_FT
PRE-OP DIAGNOSIS:  Ductal carcinoma in situ (DCIS) of left breast 19-Nov-2021 12:02:31  Ada Gallo  
show

## 2024-11-25 ENCOUNTER — APPOINTMENT (OUTPATIENT)
Dept: OBGYN | Facility: CLINIC | Age: 49
End: 2024-11-25

## 2025-07-23 NOTE — BRIEF OPERATIVE NOTE - NSICDXBRIEFPROCEDURE_GEN_ALL_CORE_FT
"----- Message from DEANGELO Latham sent at 7/23/2025 11:02 AM CDT -----  Regarding: tcc post-d/c call  Spoke with patient's spouse for tcc call. Please assist with the following medication questions    Per AVS, calcium acetate(phosphat bind) 667 mg tablet STOPPED  Per AVS, RENVELA 2.4 gram Pwpk CONTINED  -please confirm appropriate regarding two phosphate binders    Per AVS, Trazodone 50mg was continued. Upon chart review "hold lyrica and trazodone at this time". Please advise patient/spouse regarding Trazodone    PLEASE RESPOND DIRECTLY TO THE PT IN REGARD TO THIS MESSAGE.      Respectfully,  Noa Vazquez RN  Transition of Care  "
Needs hospital follow up - please schedule 40 min  
PROCEDURES:  Bilateral mastectomy with insertion of tissue expanders into both breasts 19-Nov-2021 12:01:41  Ada Gallo  Left mastectomy with sentinel lymph node biopsy 19-Nov-2021 12:02:03  Ada Gallo

## 2025-09-17 ENCOUNTER — APPOINTMENT (OUTPATIENT)
Dept: BREAST CENTER | Facility: CLINIC | Age: 50
End: 2025-09-17
Payer: OTHER GOVERNMENT

## 2025-09-17 VITALS — SYSTOLIC BLOOD PRESSURE: 112 MMHG | WEIGHT: 167 LBS | BODY MASS INDEX: 31.55 KG/M2 | DIASTOLIC BLOOD PRESSURE: 80 MMHG

## 2025-09-17 DIAGNOSIS — Z17.31 PERSONAL HISTORY OF MALIGNANT NEOPLASM OF BREAST: ICD-10-CM

## 2025-09-17 DIAGNOSIS — Z90.13 ACQUIRED ABSENCE OF BILATERAL BREASTS AND NIPPLES: ICD-10-CM

## 2025-09-17 DIAGNOSIS — Z86.000 PERSONAL HISTORY OF IN-SITU NEOPLASM OF BREAST: ICD-10-CM

## 2025-09-17 DIAGNOSIS — Z85.3 PERSONAL HISTORY OF MALIGNANT NEOPLASM OF BREAST: ICD-10-CM

## 2025-09-17 PROCEDURE — 99213 OFFICE O/P EST LOW 20 MIN: CPT

## 2025-09-17 RX ORDER — OXYBUTYNIN CHLORIDE 2.5 MG/1
TABLET ORAL
Refills: 0 | Status: ACTIVE | COMMUNITY

## 2025-09-17 RX ORDER — OXYBUTYNIN CHLORIDE 5 MG/1
5 TABLET ORAL
Refills: 0 | Status: ACTIVE | COMMUNITY